# Patient Record
Sex: FEMALE | Race: AMERICAN INDIAN OR ALASKA NATIVE
[De-identification: names, ages, dates, MRNs, and addresses within clinical notes are randomized per-mention and may not be internally consistent; named-entity substitution may affect disease eponyms.]

---

## 2018-05-18 ENCOUNTER — HOSPITAL ENCOUNTER (OUTPATIENT)
Dept: HOSPITAL 42 - SDSVAS | Age: 63
Discharge: HOME | End: 2018-05-18
Attending: INTERNAL MEDICINE
Payer: COMMERCIAL

## 2018-05-18 VITALS — DIASTOLIC BLOOD PRESSURE: 80 MMHG | OXYGEN SATURATION: 96 % | HEART RATE: 81 BPM | SYSTOLIC BLOOD PRESSURE: 135 MMHG

## 2018-05-18 VITALS — BODY MASS INDEX: 20.8 KG/M2

## 2018-05-18 VITALS — RESPIRATION RATE: 18 BRPM | TEMPERATURE: 98.1 F

## 2018-05-18 DIAGNOSIS — I25.10: ICD-10-CM

## 2018-05-18 DIAGNOSIS — I10: ICD-10-CM

## 2018-05-18 DIAGNOSIS — I73.9: Primary | ICD-10-CM

## 2018-05-18 DIAGNOSIS — F17.210: ICD-10-CM

## 2018-05-18 LAB
APTT BLD: 29.2 SECONDS (ref 25.1–36.5)
BASOPHILS # BLD AUTO: 0.03 K/MM3 (ref 0–2)
BASOPHILS NFR BLD: 0.5 % (ref 0–3)
BUN SERPL-MCNC: 17 MG/DL (ref 7–21)
CALCIUM SERPL-MCNC: 9.7 MG/DL (ref 8.4–10.5)
EOSINOPHIL # BLD: 0 10*3/UL (ref 0–0.7)
EOSINOPHIL NFR BLD: 0.7 % (ref 1.5–5)
ERYTHROCYTE [DISTWIDTH] IN BLOOD BY AUTOMATED COUNT: 13 % (ref 11.5–14.5)
GFR NON-AFRICAN AMERICAN: > 60
GRANULOCYTES # BLD: 1.79 10*3/UL (ref 1.4–6.5)
GRANULOCYTES NFR BLD: 29.1 % (ref 50–68)
HGB BLD-MCNC: 14.1 G/DL (ref 12–16)
INR PPP: 1.09 (ref 0.93–1.08)
LYMPHOCYTES # BLD: 4 10*3/UL (ref 1.2–3.4)
LYMPHOCYTES NFR BLD AUTO: 65.5 % (ref 22–35)
MCH RBC QN AUTO: 32 PG (ref 25–35)
MCHC RBC AUTO-ENTMCNC: 34 G/DL (ref 31–37)
MCV RBC AUTO: 94.3 FL (ref 80–105)
MONOCYTES # BLD AUTO: 0.3 10*3/UL (ref 0.1–0.6)
MONOCYTES NFR BLD: 4.2 % (ref 1–6)
PLATELET # BLD: 329 10^3/UL (ref 120–450)
PMV BLD AUTO: 9.1 FL (ref 7–11)
PROTHROMBIN TIME: 12.5 SECONDS (ref 9.4–12.5)
RBC # BLD AUTO: 4.4 10^6/UL (ref 3.5–6.1)
WBC # BLD AUTO: 6.2 10^3/UL (ref 4.5–11)

## 2018-05-18 PROCEDURE — 85730 THROMBOPLASTIN TIME PARTIAL: CPT

## 2018-05-18 PROCEDURE — 85610 PROTHROMBIN TIME: CPT

## 2018-05-18 PROCEDURE — 75630 X-RAY AORTA LEG ARTERIES: CPT

## 2018-05-18 PROCEDURE — 80048 BASIC METABOLIC PNL TOTAL CA: CPT

## 2018-05-18 PROCEDURE — 86900 BLOOD TYPING SEROLOGIC ABO: CPT

## 2018-05-18 PROCEDURE — 36200 PLACE CATHETER IN AORTA: CPT

## 2018-05-18 PROCEDURE — 86850 RBC ANTIBODY SCREEN: CPT

## 2018-05-18 PROCEDURE — 36415 COLL VENOUS BLD VENIPUNCTURE: CPT

## 2018-05-18 PROCEDURE — 99152 MOD SED SAME PHYS/QHP 5/>YRS: CPT

## 2018-05-18 PROCEDURE — 85025 COMPLETE CBC W/AUTO DIFF WBC: CPT

## 2018-05-24 NOTE — VAS
DATE:  05/18/2018



:  Todd Cheng MD



INDICATIONS:  Claudication and peripheral vascular disease.



PROCEDURE DONE:  Abdominal angiogram with peripheral runoff.



TECHNIQUE:  After informed consent, patient was prepped and draped in the

usual fashion.  Patient received lidocaine in the right femoral artery for

local anesthesia.  Patient received moderate sedation by me.  Using a Cook

needle, right femoral artery was entered.  A 5-Khmer sheath was placed

using Seldinger technique.  Omni flush was taken through the abdominal

aorta.  Subsequent DSAs were taken from the abdominal aorta to the feet. 

Patient tolerated the procedure well.  Sheath was removed.  The manual

pressure was used for hemostasis.



RESULTS:

1.  Abdominal aorta was not showing any signs of aneurysm.  Right renal

artery was patent.  Left renal artery was patent.

2.  Right extremity:  This showed mild disease in the common iliac and the

internal iliac with severe disease of 80%.  Femoral artery was patent. 

Superficial femoral artery had severe disease and was 100% occluded in the

mid _____collaterals.  Popliteal artery could not be fully visualized, but

probably filled through collaterals.  There appeared to be one-vessel

runoff.

3.  Left lower extremity:  The common iliac had mild disease so as the

external iliac of femoral artery was patent, superficial femoral was

severely diseased and was 100% occluded with collateral.  Popliteal artery

was patent and appeared to be three-vessel runoff.



CONCLUSIONS:

1.  Severe peripheral vascular disease.

2.  Right iliac severe disease.

3.  Bilateral superficial femoral 100% occluded.

4.  Right popliteal disease and distal tibial disease.



RECOMMENDATIONS:  Continue walking program.  We will consider TPA of the

left superficial femoral artery given patient's worsening claudication in

that leg.





__________________________________________

Todd Cheng MD



cc:  Nils Byers MD



DD:  05/23/2018 13:43:27

DT:  05/24/2018 2:18:00

Job # [id number]